# Patient Record
Sex: MALE | Race: WHITE
[De-identification: names, ages, dates, MRNs, and addresses within clinical notes are randomized per-mention and may not be internally consistent; named-entity substitution may affect disease eponyms.]

---

## 2018-01-04 ENCOUNTER — HOSPITAL ENCOUNTER (EMERGENCY)
Dept: HOSPITAL 17 - NEPD | Age: 38
Discharge: HOME | End: 2018-01-04
Payer: COMMERCIAL

## 2018-01-04 VITALS — WEIGHT: 220.46 LBS | HEIGHT: 73 IN | BODY MASS INDEX: 29.22 KG/M2

## 2018-01-04 VITALS
HEART RATE: 85 BPM | TEMPERATURE: 97.8 F | SYSTOLIC BLOOD PRESSURE: 163 MMHG | OXYGEN SATURATION: 97 % | RESPIRATION RATE: 20 BRPM | DIASTOLIC BLOOD PRESSURE: 98 MMHG

## 2018-01-04 VITALS — SYSTOLIC BLOOD PRESSURE: 126 MMHG | DIASTOLIC BLOOD PRESSURE: 86 MMHG

## 2018-01-04 DIAGNOSIS — Z79.899: ICD-10-CM

## 2018-01-04 DIAGNOSIS — F17.210: ICD-10-CM

## 2018-01-04 DIAGNOSIS — F31.9: ICD-10-CM

## 2018-01-04 DIAGNOSIS — Z79.1: ICD-10-CM

## 2018-01-04 DIAGNOSIS — B19.20: ICD-10-CM

## 2018-01-04 DIAGNOSIS — Z91.030: ICD-10-CM

## 2018-01-04 DIAGNOSIS — F41.9: Primary | ICD-10-CM

## 2018-01-04 PROCEDURE — 99283 EMERGENCY DEPT VISIT LOW MDM: CPT

## 2018-01-04 NOTE — PD
HPI


Chief Complaint:  Anxiety


Time Seen by Provider:  19:30


Travel History


International Travel<30 days:  No


Contact w/Intl Traveler<30days:  No


Traveled to known affect area:  No





History of Present Illness


HPI


37-year-old male with history of bipolar disorder, presents emergency 

department for evaluation of worsening anxiety.  Patient states that he 

recently has been cleared for [facility] for treatment of his hepatitis C but at 

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  This has caused him to 

be significantly worried and more stressed.  Patient states he has been off of 

his medications for about 3 months since getting insurance.  He states he has 

not been able to find a new psychiatrist in [facility] [place] will no longer 

prescribe his medication.  He states he has become very jumpy and agitated.  He 

feels increasingly anxious that he is unable to sleep.  Denies suicidal 

homicidal ideations.  Denies illicit drug use.  States that he does try to 

avoid caffeine as this seems to worsen his anxiety.  Denies any chest pain or 

tightness.  No difficulty breathing.  Patient has no other symptoms to report.





PFSH


Past Medical History


Arthritis:  Yes


Blood Disorders:  No


Bipolar Disorder:  Yes


Anxiety:  Yes (recent increase)


Depression:  Yes


Cancer:  No


Cardiovascular Problems:  No


Chest Pain:  No


Congestive Heart Failure:  No


Cerebrovascular Accident:  No


Diabetes:  No


Diminished Hearing:  No


Endocrine:  Yes (hypoglycemia)


Gastrointestinal Disorders:  Yes (HEP C)


Genitourinary:  No


Headaches:  No


Hepatitis:  Yes (HEP C)


Hiatal Hernia:  No


Heparin Induced Thrombocytopen:  No


Hypertension:  No


Immune Disorder:  No


Implanted Vascular Access Dvce:  Yes


Neurologic:  No


Psychiatric:  Yes (ANXIETY, DEPRESSION)


Reproductive:  No


Immunizations Current:  Yes


Migraines:  No


Seizures:  No


Thyroid Disease:  No





Past Surgical History


Abdominal Surgery:  No


AICD:  No


Appendectomy:  No


Body Medical Devices:  screws in r shoulder


Cardiac Surgery:  No


Cholecystectomy:  No


Ear Surgery:  No


Endocrine Surgery:  No


Genitourinary Surgery:  No


Joint Replacement:  No


Neurologic Surgery:  No


Oral Surgery:  Yes (2007-"DENTAL SURGERY,IMPLANTS")


Pacemaker:  No


Thoracic Surgery:  No


Other Surgery:  Yes





Social History


Alcohol Use:  Yes (special occu)


Tobacco Use:  Yes (less steve 1/2 ppd)


Substance Use:  No (cocaine,pot,stopped 1 yr.ago.)





Allergies-Medications


(Allergen,Severity, Reaction):  


Coded Allergies:  


     bee venom protein (honey bee) (Unverified  Allergy, Intermediate, Swelling

, 8/15/17)


Reported Meds & Prescriptions





Reported Meds & Active Scripts


Active


Ibuprofen 800 Mg Tab 800 Mg PO TID PRN


Medrol Dosepak (Methylprednisolone) 4 Mg Denis 4 Mg PO AS DIRECTED


     TAKE AS DIRECTED


Robaxin (Methocarbamol) 750 Mg Tab 750 Mg PO QID PRN


Ultram (Tramadol HCl) 50 Mg Tab 50 Mg PO Q4H PRN


Reported


Suboxone 8 mg/2 mg 8 mg/2 mg Subl 2 Strip SL DAILY


     SUBLINGUAL STRIP.


Clonazepam 1 Mg Tab 0.5 Mg PO Q12








Review of Systems


Except as stated in HPI:  all other systems reviewed are Neg





Physical Exam


Narrative


GENERAL: Well-nourished male patient, ambulatory and in no acute distress.


SKIN: Focused skin assessment warm/dry.


HEAD: Atraumatic. Normocephalic. 


EYES: Pupils equal and round. No scleral icterus. No injection or drainage. 


ENT: No nasal bleeding or discharge.  Mucous membranes pink and moist.


NECK: Trachea midline. No JVD. 


CARDIOVASCULAR: Regular rate and rhythm.  No murmur appreciated.


RESPIRATORY: No accessory muscle use. Clear to auscultation. Breath sounds 

equal bilaterally. 


GASTROINTESTINAL: Abdomen soft, non-tender, nondistended. Hepatic and splenic 

margins not palpable. 


MUSCULOSKELETAL: No obvious deformities. No clubbing.  No cyanosis.  No edema. 


NEUROLOGICAL: Awake and alert. No obvious cranial nerve deficits.  Motor 

grossly within normal limits. Normal speech.


PSYCHIATRIC: Appropriate mood and affect; insight and judgment normal.





Data


Data


Last Documented VS





Vital Signs








  Date Time  Temp Pulse Resp B/P (MAP) Pulse Ox O2 Delivery O2 Flow Rate FiO2


 


1/4/18 19:09     (119)    


 


1/4/18 18:37 97.8 85 20  97 Room Air  








Orders





 Orders


Lorazepam (Ativan) (1/4/18 19:45)


Ed Discharge Order (1/4/18 20:14)








MDM


Medical Decision Making


Medical Screen Exam Complete:  Yes


Emergency Medical Condition:  Yes


Medical Record Reviewed:  Yes


Differential Diagnosis


Anxiety versus mood disorder versus personality disorder versus adjustment 

reaction disorder


Narrative Course


37-year-old male presents to emergency department for evaluation of worsening 

anxiety.  Patient appears without distress.  He does voice concern about his 

worsening anxiety and feeling more jumpy to where he feels he is unable to 

function to his fullest potential.  Patient is accompanied by his girlfriend.  

He does appear legitimately concerned.  I discussed with Kate psychiatric 

ARNP who advises one month of Vistaril and we will provide him a packet of 

psychiatrists that he could try to contact.  This plan is discussed with the 

patient.  He is in agreement with this clinic care and will return immediately 

with any acute worsening symptoms.





Diagnosis





 Primary Impression:  


 Anxiety


Referrals:  


Primary Care Physician





Psychiatrist


Patient Instructions:  Anxiety (ED), General Instructions





***Additional Instructions:  


Take medication as prescribed


Please refer to your resource list for potential psychiatrist to establish care 

with


Follow-up with a primary care provider


Return immediately with any acute worsening symptoms


***Med/Other Pt SpecificInfo:  Prescription(s) given


Scripts


Hydroxyzine Pamoate (Vistaril) 25 Mg Cap


1-2 CAP PO Q6H Y for ANXIETY AND/OR INSOMNIA, #60 CAP 0 Refills


   Prov: Nohemi Corrales         1/4/18


Disposition:  01 DISCHARGE HOME


Condition:  Stable











Nohemi Corrales Jan 4, 2018 19:40